# Patient Record
Sex: MALE | Race: OTHER | HISPANIC OR LATINO | URBAN - METROPOLITAN AREA
[De-identification: names, ages, dates, MRNs, and addresses within clinical notes are randomized per-mention and may not be internally consistent; named-entity substitution may affect disease eponyms.]

---

## 2017-09-11 ENCOUNTER — EMERGENCY (EMERGENCY)
Facility: HOSPITAL | Age: 27
LOS: 1 days | Discharge: HOME | End: 2017-09-11
Attending: EMERGENCY MEDICINE | Admitting: EMERGENCY MEDICINE
Payer: COMMERCIAL

## 2017-09-11 VITALS
SYSTOLIC BLOOD PRESSURE: 122 MMHG | WEIGHT: 188.5 LBS | HEIGHT: 69 IN | RESPIRATION RATE: 18 BRPM | HEART RATE: 56 BPM | TEMPERATURE: 98 F | DIASTOLIC BLOOD PRESSURE: 81 MMHG | OXYGEN SATURATION: 100 %

## 2017-09-11 PROCEDURE — 99283 EMERGENCY DEPT VISIT LOW MDM: CPT

## 2017-09-11 PROCEDURE — 99283 EMERGENCY DEPT VISIT LOW MDM: CPT | Mod: 25

## 2017-09-11 RX ORDER — LIDOCAINE 4 G/100G
10 CREAM TOPICAL ONCE
Qty: 0 | Refills: 0 | Status: COMPLETED | OUTPATIENT
Start: 2017-09-11 | End: 2017-09-11

## 2017-09-11 RX ORDER — FAMOTIDINE 10 MG/ML
1 INJECTION INTRAVENOUS
Qty: 30 | Refills: 0 | OUTPATIENT
Start: 2017-09-11 | End: 2017-10-11

## 2017-09-11 RX ORDER — FAMOTIDINE 10 MG/ML
20 INJECTION INTRAVENOUS ONCE
Qty: 0 | Refills: 0 | Status: COMPLETED | OUTPATIENT
Start: 2017-09-11 | End: 2017-09-11

## 2017-09-11 RX ADMIN — LIDOCAINE 10 MILLILITER(S): 4 CREAM TOPICAL at 04:09

## 2017-09-11 RX ADMIN — Medication 30 MILLILITER(S): at 04:09

## 2017-09-11 RX ADMIN — FAMOTIDINE 20 MILLIGRAM(S): 10 INJECTION INTRAVENOUS at 04:08

## 2017-09-11 NOTE — ED ADULT NURSE NOTE - OBJECTIVE STATEMENT
28 y/o male with no significant medical hx arrived to [facility] ER reporting burning sensation to his chest for the past 2 days. Patient point to epigastrium when asked to localize the pain. Upon assessment, abdomen soft, lung fields WNL, breathing is equal and unlabored, pulses palpable, no visible injuries noted. Pt denies headache, dizziness, blurred vision, slurred speech, nausea, vomiting, diarrhea, fever, chills, LOC, SOB, weakness, fatigue, recent injury, recent travel. Care in progress. Awaiting disposition

## 2017-09-11 NOTE — ED PROVIDER NOTE - OBJECTIVE STATEMENT
here with burning pain in upper abdomen radiating up chest today.  Had a few margaritas and pizza.  Denies fever/chills, vomiting.  Did not take anything for symptoms.  Worse with laying down

## 2017-09-11 NOTE — ED ADULT TRIAGE NOTE - ARRIVAL INFO ADDITIONAL COMMENTS
Pt c/o of burning sensation in his throat radiating into his mid chest since Saturday. Pt denies sob, nausea, or vomiting.

## 2017-09-15 DIAGNOSIS — R07.89 OTHER CHEST PAIN: ICD-10-CM

## 2017-09-15 DIAGNOSIS — K21.9 GASTRO-ESOPHAGEAL REFLUX DISEASE WITHOUT ESOPHAGITIS: ICD-10-CM

## 2018-01-15 ENCOUNTER — EMERGENCY (EMERGENCY)
Facility: HOSPITAL | Age: 28
LOS: 1 days | Discharge: HOME | End: 2018-01-15
Attending: EMERGENCY MEDICINE | Admitting: EMERGENCY MEDICINE
Payer: SELF-PAY

## 2018-01-15 VITALS
DIASTOLIC BLOOD PRESSURE: 77 MMHG | TEMPERATURE: 98 F | RESPIRATION RATE: 16 BRPM | OXYGEN SATURATION: 100 % | SYSTOLIC BLOOD PRESSURE: 121 MMHG | HEART RATE: 68 BPM

## 2018-01-15 VITALS
SYSTOLIC BLOOD PRESSURE: 119 MMHG | DIASTOLIC BLOOD PRESSURE: 79 MMHG | HEIGHT: 69 IN | RESPIRATION RATE: 17 BRPM | WEIGHT: 190.04 LBS | TEMPERATURE: 99 F | OXYGEN SATURATION: 98 % | HEART RATE: 67 BPM

## 2018-01-15 LAB
ALBUMIN SERPL ELPH-MCNC: 4.6 G/DL — SIGNIFICANT CHANGE UP (ref 3.3–5)
ALP SERPL-CCNC: 67 U/L — SIGNIFICANT CHANGE UP (ref 40–120)
ALT FLD-CCNC: 28 U/L — SIGNIFICANT CHANGE UP (ref 10–45)
ANION GAP SERPL CALC-SCNC: 16 MMOL/L — SIGNIFICANT CHANGE UP (ref 5–17)
APPEARANCE UR: CLEAR — SIGNIFICANT CHANGE UP
AST SERPL-CCNC: 31 U/L — SIGNIFICANT CHANGE UP (ref 10–40)
BASOPHILS NFR BLD AUTO: 0.4 % — SIGNIFICANT CHANGE UP (ref 0–2)
BILIRUB SERPL-MCNC: 0.4 MG/DL — SIGNIFICANT CHANGE UP (ref 0.2–1.2)
BILIRUB UR-MCNC: NEGATIVE — SIGNIFICANT CHANGE UP
BUN SERPL-MCNC: 13 MG/DL — SIGNIFICANT CHANGE UP (ref 7–23)
CALCIUM SERPL-MCNC: 9.7 MG/DL — SIGNIFICANT CHANGE UP (ref 8.4–10.5)
CHLORIDE SERPL-SCNC: 100 MMOL/L — SIGNIFICANT CHANGE UP (ref 96–108)
CO2 SERPL-SCNC: 25 MMOL/L — SIGNIFICANT CHANGE UP (ref 22–31)
COLOR SPEC: YELLOW — SIGNIFICANT CHANGE UP
CREAT SERPL-MCNC: 0.96 MG/DL — SIGNIFICANT CHANGE UP (ref 0.5–1.3)
DIFF PNL FLD: NEGATIVE — SIGNIFICANT CHANGE UP
EOSINOPHIL NFR BLD AUTO: 0.4 % — SIGNIFICANT CHANGE UP (ref 0–6)
GLUCOSE SERPL-MCNC: 98 MG/DL — SIGNIFICANT CHANGE UP (ref 70–99)
GLUCOSE UR QL: NEGATIVE — SIGNIFICANT CHANGE UP
HCT VFR BLD CALC: 43.2 % — SIGNIFICANT CHANGE UP (ref 39–50)
HGB BLD-MCNC: 14.9 G/DL — SIGNIFICANT CHANGE UP (ref 13–17)
KETONES UR-MCNC: NEGATIVE — SIGNIFICANT CHANGE UP
LEUKOCYTE ESTERASE UR-ACNC: NEGATIVE — SIGNIFICANT CHANGE UP
LIDOCAIN IGE QN: 25 U/L — SIGNIFICANT CHANGE UP (ref 7–60)
LYMPHOCYTES # BLD AUTO: 37.3 % — SIGNIFICANT CHANGE UP (ref 13–44)
MCHC RBC-ENTMCNC: 28.2 PG — SIGNIFICANT CHANGE UP (ref 27–34)
MCHC RBC-ENTMCNC: 34.5 G/DL — SIGNIFICANT CHANGE UP (ref 32–36)
MCV RBC AUTO: 81.7 FL — SIGNIFICANT CHANGE UP (ref 80–100)
MONOCYTES NFR BLD AUTO: 8.9 % — SIGNIFICANT CHANGE UP (ref 2–14)
NEUTROPHILS NFR BLD AUTO: 53 % — SIGNIFICANT CHANGE UP (ref 43–77)
NITRITE UR-MCNC: NEGATIVE — SIGNIFICANT CHANGE UP
PH UR: 6 — SIGNIFICANT CHANGE UP (ref 5–8)
PLATELET # BLD AUTO: 256 K/UL — SIGNIFICANT CHANGE UP (ref 150–400)
POTASSIUM SERPL-MCNC: 4 MMOL/L — SIGNIFICANT CHANGE UP (ref 3.5–5.3)
POTASSIUM SERPL-SCNC: 4 MMOL/L — SIGNIFICANT CHANGE UP (ref 3.5–5.3)
PROT SERPL-MCNC: 7.7 G/DL — SIGNIFICANT CHANGE UP (ref 6–8.3)
PROT UR-MCNC: NEGATIVE MG/DL — SIGNIFICANT CHANGE UP
RBC # BLD: 5.29 M/UL — SIGNIFICANT CHANGE UP (ref 4.2–5.8)
RBC # FLD: 12.9 % — SIGNIFICANT CHANGE UP (ref 10.3–16.9)
SODIUM SERPL-SCNC: 141 MMOL/L — SIGNIFICANT CHANGE UP (ref 135–145)
SP GR SPEC: 1.02 — SIGNIFICANT CHANGE UP (ref 1–1.03)
UROBILINOGEN FLD QL: 0.2 E.U./DL — SIGNIFICANT CHANGE UP
WBC # BLD: 5.2 K/UL — SIGNIFICANT CHANGE UP (ref 3.8–10.5)
WBC # FLD AUTO: 5.2 K/UL — SIGNIFICANT CHANGE UP (ref 3.8–10.5)

## 2018-01-15 PROCEDURE — 80053 COMPREHEN METABOLIC PANEL: CPT

## 2018-01-15 PROCEDURE — 74177 CT ABD & PELVIS W/CONTRAST: CPT

## 2018-01-15 PROCEDURE — 99284 EMERGENCY DEPT VISIT MOD MDM: CPT | Mod: 25

## 2018-01-15 PROCEDURE — 99285 EMERGENCY DEPT VISIT HI MDM: CPT

## 2018-01-15 PROCEDURE — 36415 COLL VENOUS BLD VENIPUNCTURE: CPT

## 2018-01-15 PROCEDURE — 74177 CT ABD & PELVIS W/CONTRAST: CPT | Mod: 26

## 2018-01-15 PROCEDURE — 85025 COMPLETE CBC W/AUTO DIFF WBC: CPT

## 2018-01-15 PROCEDURE — 83690 ASSAY OF LIPASE: CPT

## 2018-01-15 PROCEDURE — 81003 URINALYSIS AUTO W/O SCOPE: CPT

## 2018-01-15 RX ORDER — IOHEXOL 300 MG/ML
50 INJECTION, SOLUTION INTRAVENOUS ONCE
Qty: 0 | Refills: 0 | Status: COMPLETED | OUTPATIENT
Start: 2018-01-15 | End: 2018-01-15

## 2018-01-15 RX ADMIN — IOHEXOL 50 MILLILITER(S): 300 INJECTION, SOLUTION INTRAVENOUS at 09:25

## 2018-01-15 NOTE — ED PROVIDER NOTE - OBJECTIVE STATEMENT
Pt previously healthy with complaints of rlq pain for 2d, difficulty walking yesterday, no prior abd surgeries, no f/c/, n/v. has not tried anything for sx, no aggr or relieving fx. no hx of kidney stones. Pt previously healthy with complaints of rlq pain for 2d, difficulty walking yesterday, no prior abd surgeries, no f/c/, n/v. has not tried anything for sx, no aggr or relieving fx. no hx of kidney stones. no groin pain/testicular pain.

## 2018-01-15 NOTE — ED ADULT NURSE NOTE - OBJECTIVE STATEMENT
Pt presented to ED with RLQ pain. As per pt, onset of pain was yesterday morning and pain was worse yesterday, pt describes pain as "sharp, 6/10." Pt denies N/V/D, urinary symptom, fever, chills, chest pain, SOB, dizziness nor any other symptom at this time. Pt reports that last BM was yesterday and it was "normal."  Abdomen non-distended and render, +BS to all quadrants, respiration unlabored and even, skin warm and non-diaphoretic, NAD noted.

## 2018-01-15 NOTE — ED PROVIDER NOTE - CHPI ED SYMPTOMS NEG
no vomiting/no fever/no diarrhea/no nausea/no blood in stool/no chills/no burning urination/no abdominal distension/no hematuria/no dysuria

## 2018-01-15 NOTE — ED ADULT NURSE NOTE - CHPI ED SYMPTOMS NEG
no vomiting/no dysuria/no diarrhea/no nausea/no burning urination/no chills/no fever/no hematuria/no blood in stool/no abdominal distension

## 2018-01-15 NOTE — ED PROVIDER NOTE - PHYSICAL EXAMINATION
CONSTITUTIONAL: Well appearing, well nourished, awake, alert and in no apparent distress.  HEENT: Head is atraumatic. Eyes clear bilaterally, normal EOMI. Airway patent.  CARDIAC: Normal rate, regular rhythm.  Heart sounds S1, S2.   RESPIRATORY: Breath sounds clear and equal bilaterally.  GASTROINTESTINAL: Abdomen soft, point rlq tenderness, no g/r, no distension  MUSCULOSKELETAL: Spine appears normal, range of motion is not limited, no muscle or joint tenderness.   NEUROLOGICAL: Alert and oriented, no focal deficits, no motor or sensory deficits.  SKIN: Skin normal color for race, warm, dry and intact. No evidence of rash.  PSYCHIATRIC: Alert and oriented to person, place, time/situation. normal mood and affect. no apparent risk to self or others.

## 2018-01-15 NOTE — ED PROVIDER NOTE - PROGRESS NOTE DETAILS
CT reviewed, abd reeval soft, other labs wnl.     Discussed with pt results of work up, strict return precautions, and need for follow up.  Pt expressed understanding and agrees with plan.

## 2018-01-19 DIAGNOSIS — R10.31 RIGHT LOWER QUADRANT PAIN: ICD-10-CM

## 2018-01-19 DIAGNOSIS — R26.2 DIFFICULTY IN WALKING, NOT ELSEWHERE CLASSIFIED: ICD-10-CM

## 2025-02-10 ENCOUNTER — EMERGENCY (EMERGENCY)
Facility: HOSPITAL | Age: 35
LOS: 1 days | Discharge: HOME | End: 2025-02-10
Admitting: EMERGENCY MEDICINE
Payer: COMMERCIAL

## 2025-02-10 VITALS
SYSTOLIC BLOOD PRESSURE: 120 MMHG | OXYGEN SATURATION: 100 % | WEIGHT: 190.04 LBS | DIASTOLIC BLOOD PRESSURE: 78 MMHG | RESPIRATION RATE: 19 BRPM | TEMPERATURE: 98 F | HEIGHT: 69 IN | HEART RATE: 73 BPM

## 2025-02-10 DIAGNOSIS — Z88.6 ALLERGY STATUS TO ANALGESIC AGENT: ICD-10-CM

## 2025-02-10 PROCEDURE — 26605 TREAT METACARPAL FRACTURE: CPT | Mod: F9

## 2025-02-10 PROCEDURE — 73130 X-RAY EXAM OF HAND: CPT

## 2025-02-10 PROCEDURE — 73130 X-RAY EXAM OF HAND: CPT | Mod: 26,RT,77

## 2025-02-10 PROCEDURE — 73130 X-RAY EXAM OF HAND: CPT | Mod: 26,RT

## 2025-02-10 PROCEDURE — 99285 EMERGENCY DEPT VISIT HI MDM: CPT

## 2025-02-10 PROCEDURE — 99285 EMERGENCY DEPT VISIT HI MDM: CPT | Mod: 25

## 2025-02-10 NOTE — ED ADULT NURSE NOTE - NSFALLUNIVINTERV_ED_ALL_ED
Bed/Stretcher in lowest position, wheels locked, appropriate side rails in place/Call bell, personal items and telephone in reach/Instruct patient to call for assistance before getting out of bed/chair/stretcher/Non-slip footwear applied when patient is off stretcher/[place] to call system/Physically safe environment - no spills, clutter or unnecessary equipment/Purposeful proactive rounding/Room/bathroom lighting operational, light cord in reach

## 2025-02-10 NOTE — ED PROVIDER NOTE - PATIENT PORTAL LINK FT
You can access the FollowMyHealth Patient Portal offered by Health system by registering at the following website: [email or web address]. By joining [facility]’s FollowMyHealth portal, you will also be able to view your health information using other applications (apps) compatible with our system.

## 2025-02-10 NOTE — ED PROVIDER NOTE - CLINICAL SUMMARY MEDICAL DECISION MAKING FREE TEXT BOX
34-year-old male right-hand-dominant complaining of right hand pain and swelling after he hit his hand on a table yesterday.  Pain radiates into his fingers.  Denies numbness or tingling. Right hand-  swelling, bruising and tenderness over fourth and fifth medical carpals. 34-year-old male right-hand-dominant complaining of right hand pain and swelling after he hit his hand on a table yesterday.  Pain radiates into his fingers.  Denies numbness or tingling. Right hand-  swelling, bruising and tenderness over fourth and fifth medical carpals. +fx of 5th metatarsal, reduced and splinted by Dr. Olson

## 2025-02-10 NOTE — ED PROVIDER NOTE - OBJECTIVE STATEMENT
34-year-old male right-hand-dominant complaining of right hand pain and swelling after he hit his hand on a table yesterday.  Pain radiates into his fingers.  Denies numbness or tingling.

## 2025-02-10 NOTE — ED PROVIDER NOTE - NSFOLLOWUPINSTRUCTIONS_ED_ALL_ED_FT
Metacarpal Fracture  Bones of the hand, showing a metacarpal fracture.  A metacarpal fracture is a break, also called a fracture, in one of the five bones in your hand. The bones go from your wrist to your knuckles.    The metacarpal bones connect your thumb and fingers to your wrist.    What are the causes?  A fall.  A hard hit to the hand.  An injury that:  Squeezes a knuckle.  Stretches a finger out of place.  Crushes the hand.  What increases the risk?  Playing contact sports.  Having a condition that causes bones to become weak and brittle called osteoporosis.  What are the signs or symptoms?  Pain.  Swelling.  Stiffness.  Bruises.  Not being able to move a finger.  A finger being a shape that's different than normal.  A bend or bump in the hand or finger that's not normal.  How is this diagnosed?  Your symptoms and medical history.  A physical exam.  An X-ray.  How is this treated?  A metacarpal fracture may be treated with a splint, cast, or surgery. Treatment depends on how bad your fracture is and how the pieces of the broken bone line up with each other.  If the pieces of the broken bone line up well, you may need to:  Wear a splint or cast for several weeks.  Have the injured finger taped to an uninjured finger next to it. This is called buddy taping.  If the pieces of the broken bone do not line up well, your health care provider may:  Move the bones back into position without surgery. This is called closed reduction.  Do surgery to line up the fracture. Metal screws, pins, or wires are used to put the bones back in place.  After these treatments, you will need to wear a splint or cast for several weeks.  Treatment may also include:  Follow-up visits and X-rays to make sure you're healing well.  Physical therapy or occupational therapy after your cast or splint is removed. This will help your hand move better and get stronger.  Follow these instructions at home:  If you have a splint that can be taken off:    Wear the splint as told. Take it off only if your provider says you can.  Check the skin around it every day. Tell your provider if you see problems.  Loosen the splint if your fingers tingle, are numb, or turn cold and blue.  Keep the splint clean and dry.  If you have a cast that can't be taken off:    Do not put pressure on any part of the cast until it's hard. This may take a few hours.  Do not stick anything inside it to scratch your skin. Doing this can lead to infection.  Check the skin around the cast every day. Tell your provider if you see problems.  It's OK to put lotion on dry skin around the cast.  Keep the cast clean and dry.  Bathing    Do not take baths, swim, or use a hot tub until you're told it's OK. Ask if you can shower.  If the splint or cast isn't waterproof:  Do not let it get wet.  Cover it when you take a bath or shower. Use a cover that doesn't let any water in.  Managing pain, stiffness, and swelling    Bag of ice on a towel on the skin.  Use ice or an ice pack as told.  If you have a splint that you can take off, remove it only as told.  Place a towel between your skin and the ice.  Leave the ice on for 20 minutes, 2–3 times a day.  If your skin turns red, take off the ice right away to prevent skin damage. The risk of damage is higher if you can't feel pain, heat, or cold.  Move your fingers often to reduce stiffness and swelling.  Raise your hand above the level of your heart while you're sitting or lying down. Use pillows as needed.  Activity    Do not lift or hold anything with your injured hand.  Ask what things are safe for you to do at home. Ask when you can go back to work or school.  Exercise as told.  Driving    Ask your provider if it's safe to drive or use machines while taking your medicine.  Ask when it's safe to drive if you have a cast or splint on your hand.  General instructions    Take your medicines only as told by your provider.  Do not smoke, vape, or use nicotine or tobacco. These can slow down healing.  Keep all follow-up visits. Your provider will need to check how your fracture is healing.  Contact a health care provider if:  You have pain that gets worse or does not get better with medicine.  You have redness or swelling that gets worse.  You have a fever.  You have a bad smell coming from under your cast or splint.  Get help right away if:  You have very bad pain.  Your hand or fingernails turn blue or gray, even after you loosen your splint.  Your hand feels cold or numb, even after you loosen your splint.  This information is not intended to replace advice given to you by your health care provider. Make sure you discuss any questions you have with your health care provider.

## 2025-02-10 NOTE — ED PROVIDER NOTE - PHYSICAL EXAMINATION
CONSTITUTIONAL: Well-appearing;  in no apparent distress.   HEAD: Normocephalic; atraumatic.   EYES: PERRL; EOM intact; conjunctiva and sclera clear  ENT: normal nose; no rhinorrhea; normal pharynx with no erythema or lesions.   NECK: Supple; non-tender;   CARDIOVASCULAR: rrr,  RESPIRATORY: Breathing easily;   MSK:   Right hand-  swelling, bruising and tenderness over fourth and fifth medical carpals.

## 2025-02-10 NOTE — ED PROVIDER NOTE - CARE PROVIDER_API CALL
Mason Olson  Plastic Surgery  [address] [place] [id number]  Phone: [phone]  Fax: [phone]  Follow Up Time:

## 2025-02-10 NOTE — ED ADULT NURSE NOTE - OBJECTIVE STATEMENT
34yoM came to ED c/o R hand pain s/p slamming it against a door last night. Hand appears red and swollen. Radial pulses palpable. Pt c/o numbness and tingling in the hand and fingers., Able to move hand with pain, Pt denies taking any pain medication prior to coming.
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
## 2025-02-13 DIAGNOSIS — M79.641 PAIN IN RIGHT HAND: ICD-10-CM
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
## 2025-02-13 DIAGNOSIS — S62.316A DISPLACED FRACTURE OF BASE OF FIFTH METACARPAL BONE, RIGHT HAND, INITIAL ENCOUNTER FOR CLOSED FRACTURE: ICD-10-CM

## 2025-03-04 ENCOUNTER — OUTPATIENT (OUTPATIENT)
Dept: OUTPATIENT SERVICES | Facility: HOSPITAL | Age: 35
LOS: 1 days | End: 2025-03-04
Payer: COMMERCIAL

## 2025-03-04 PROCEDURE — 73100 X-RAY EXAM OF WRIST: CPT | Mod: 26,RT

## 2025-03-04 PROCEDURE — 73100 X-RAY EXAM OF WRIST: CPT

## 2025-03-24 NOTE — ED PROVIDER NOTE - DISPOSITION TYPE
Surgery and/or Procedure Scheduling     Contact Name: Veronika Lindsey   Surgical/Procedure Request: RT HAND   Patient Contact Number: [phone]       PT RETURNING CALL TO SPEAK WITH [facility] IN REGARDS TO SCHED SX     PLEASE CALL BACK PT   
DISCHARGE